# Patient Record
Sex: FEMALE | Race: WHITE | NOT HISPANIC OR LATINO | Employment: FULL TIME | URBAN - METROPOLITAN AREA
[De-identification: names, ages, dates, MRNs, and addresses within clinical notes are randomized per-mention and may not be internally consistent; named-entity substitution may affect disease eponyms.]

---

## 2023-08-21 ENCOUNTER — LAB (OUTPATIENT)
Dept: LAB | Facility: HOSPITAL | Age: 33
End: 2023-08-21
Payer: COMMERCIAL

## 2023-08-21 ENCOUNTER — OFFICE VISIT (OUTPATIENT)
Dept: INFECTIOUS DISEASES | Facility: CLINIC | Age: 33
End: 2023-08-21
Payer: COMMERCIAL

## 2023-08-21 VITALS
HEIGHT: 61 IN | HEART RATE: 73 BPM | TEMPERATURE: 98.1 F | BODY MASS INDEX: 39.08 KG/M2 | SYSTOLIC BLOOD PRESSURE: 137 MMHG | DIASTOLIC BLOOD PRESSURE: 83 MMHG | RESPIRATION RATE: 18 BRPM | WEIGHT: 207 LBS

## 2023-08-21 DIAGNOSIS — Z79.2 LONG TERM CURRENT USE OF ANTIBIOTICS: ICD-10-CM

## 2023-08-21 DIAGNOSIS — K04.7 DENTAL ABSCESS: ICD-10-CM

## 2023-08-21 DIAGNOSIS — K04.7 DENTAL ABSCESS: Primary | ICD-10-CM

## 2023-08-21 LAB
ALBUMIN SERPL-MCNC: 4.3 G/DL (ref 3.5–5.2)
ALBUMIN/GLOB SERPL: 1.7 G/DL
ALP SERPL-CCNC: 121 U/L (ref 39–117)
ALT SERPL W P-5'-P-CCNC: 21 U/L (ref 1–33)
ANION GAP SERPL CALCULATED.3IONS-SCNC: 9 MMOL/L (ref 5–15)
AST SERPL-CCNC: 22 U/L (ref 1–32)
BASOPHILS # BLD AUTO: 0.04 10*3/MM3 (ref 0–0.2)
BASOPHILS NFR BLD AUTO: 0.7 % (ref 0–1.5)
BILIRUB SERPL-MCNC: 0.3 MG/DL (ref 0–1.2)
BUN SERPL-MCNC: 13 MG/DL (ref 6–20)
BUN/CREAT SERPL: 14 (ref 7–25)
CALCIUM SPEC-SCNC: 9.3 MG/DL (ref 8.6–10.5)
CHLORIDE SERPL-SCNC: 108 MMOL/L (ref 98–107)
CO2 SERPL-SCNC: 24 MMOL/L (ref 22–29)
CREAT SERPL-MCNC: 0.93 MG/DL (ref 0.57–1)
DEPRECATED RDW RBC AUTO: 43.4 FL (ref 37–54)
EGFRCR SERPLBLD CKD-EPI 2021: 83.9 ML/MIN/1.73
EOSINOPHIL # BLD AUTO: 0.19 10*3/MM3 (ref 0–0.4)
EOSINOPHIL NFR BLD AUTO: 3.2 % (ref 0.3–6.2)
ERYTHROCYTE [DISTWIDTH] IN BLOOD BY AUTOMATED COUNT: 14.2 % (ref 12.3–15.4)
GLOBULIN UR ELPH-MCNC: 2.5 GM/DL
GLUCOSE SERPL-MCNC: 78 MG/DL (ref 65–99)
HCT VFR BLD AUTO: 37.6 % (ref 34–46.6)
HGB BLD-MCNC: 12.7 G/DL (ref 12–15.9)
IMM GRANULOCYTES # BLD AUTO: 0.03 10*3/MM3 (ref 0–0.05)
IMM GRANULOCYTES NFR BLD AUTO: 0.5 % (ref 0–0.5)
LYMPHOCYTES # BLD AUTO: 1.44 10*3/MM3 (ref 0.7–3.1)
LYMPHOCYTES NFR BLD AUTO: 24.3 % (ref 19.6–45.3)
MCH RBC QN AUTO: 28.6 PG (ref 26.6–33)
MCHC RBC AUTO-ENTMCNC: 33.8 G/DL (ref 31.5–35.7)
MCV RBC AUTO: 84.7 FL (ref 79–97)
MONOCYTES # BLD AUTO: 0.6 10*3/MM3 (ref 0.1–0.9)
MONOCYTES NFR BLD AUTO: 10.1 % (ref 5–12)
NEUTROPHILS NFR BLD AUTO: 3.63 10*3/MM3 (ref 1.7–7)
NEUTROPHILS NFR BLD AUTO: 61.2 % (ref 42.7–76)
NRBC BLD AUTO-RTO: 0 /100 WBC (ref 0–0.2)
PLATELET # BLD AUTO: 233 10*3/MM3 (ref 140–450)
PMV BLD AUTO: 10.8 FL (ref 6–12)
POTASSIUM SERPL-SCNC: 4 MMOL/L (ref 3.5–5.2)
PROT SERPL-MCNC: 6.8 G/DL (ref 6–8.5)
RBC # BLD AUTO: 4.44 10*6/MM3 (ref 3.77–5.28)
SODIUM SERPL-SCNC: 141 MMOL/L (ref 136–145)
WBC NRBC COR # BLD: 5.93 10*3/MM3 (ref 3.4–10.8)

## 2023-08-21 PROCEDURE — 36415 COLL VENOUS BLD VENIPUNCTURE: CPT

## 2023-08-21 PROCEDURE — 85025 COMPLETE CBC W/AUTO DIFF WBC: CPT

## 2023-08-21 PROCEDURE — 99205 OFFICE O/P NEW HI 60 MIN: CPT | Performed by: INTERNAL MEDICINE

## 2023-08-21 PROCEDURE — 80053 COMPREHEN METABOLIC PANEL: CPT

## 2023-08-21 RX ORDER — CLINDAMYCIN HYDROCHLORIDE 300 MG/1
1 CAPSULE ORAL 3 TIMES DAILY
COMMUNITY
Start: 2023-07-29 | End: 2023-08-21

## 2023-08-21 RX ORDER — METRONIDAZOLE 500 MG/1
500 TABLET ORAL 3 TIMES DAILY
COMMUNITY
End: 2023-08-21

## 2023-08-21 RX ORDER — ESCITALOPRAM OXALATE 20 MG/1
20 TABLET ORAL DAILY
COMMUNITY

## 2023-08-21 RX ORDER — AMOXICILLIN AND CLAVULANATE POTASSIUM 875; 125 MG/1; MG/1
1 TABLET, FILM COATED ORAL EVERY 12 HOURS
Qty: 28 TABLET | Refills: 0 | Status: SHIPPED | OUTPATIENT
Start: 2023-08-21 | End: 2023-09-04

## 2023-08-21 RX ORDER — TAMOXIFEN CITRATE 10 MG/1
10 TABLET ORAL DAILY
COMMUNITY

## 2023-08-21 RX ORDER — HYDROXYZINE HYDROCHLORIDE 10 MG/1
10 TABLET, FILM COATED ORAL DAILY
COMMUNITY

## 2023-08-21 NOTE — PROGRESS NOTES
Referring Provider: Maxx Hernandez, DMD  [address] [facility]  [address]  [place] [address]  Reason for clinic visits: Initial infectious disease clinic visit regarding mandibular abscess    HPI: Brenna SIDDIQI is a 33 y.o. female who presents to the infectious disease clinic with above complaint.    The patient underwent tooth extraction sometime in the beginning of July.  She was having pain in the area of her prior root canal with her fillings coming out multiple times.  She was scheduled to only have that tooth removed but had the second molar removed as well.  The patient is unclear why.  Unfortunately this was complicated by left-sided pain.  She went on vacation to [place] when her pain acutely worsened and she was ultimately admitted to [facility] and imaging revealed a left-sided abscess.  She was treated with IV antibiotics and steroids and underwent surgical incision and drainage. He was initially treated with IV vancomycin and meropenem.  The plan was to discharge her on a 3-week course of ertapenem but unfortunately this was not able to be arranged.  She was discharged with a PICC line in place on clindamycin 300 mg p.o. 3 times daily and Flagyl 500 mg p.o. 3 times daily.  She has remained on that antibiotics since that time.  She has been seen by her oral surgeon on August 1 and had a repeat CAT scan done on August 3 which showed a small rim-enhancing fluid collection near the left mandible concerning for an abscess.  She states her pain has resolved but she is still unable to open her mouth without feeling pain in the left jaw.  Her swelling has significantly improved although some dull remains present on the inside of her mouth.  She denies any fevers chills or night sweats.  She is tolerating antibiotics without abdominal pain nausea vomiting or diarrhea.  No rashes.  Left upper extremity PICC line remains in place without erythema or  induration    PMH  Nephrolithiasis  Anxiety  History of breast cancer  Status post hysterectomy    Social History   has no history on file for tobacco use, alcohol use, and drug use.    Family History  family history is not on file.    Not on File    The medication list has been reviewed and updated.     Review of Systems  Pertinent items are noted in HPI, all other systems reviewed and negative    Vital Signs   Vitals:    08/21/23 1016   BP: 137/83   Pulse: 73   Resp: 18   Temp: 98.1 øF (36.7 øC)        Physical Exam:   General: In no acute distress  HEENT: Patient is only able to open her mouth 1 to 2 cm, some swelling on the left persist no erythema or drainage   Respiratory: Breathing comfortably on room air  Neurological: Alert and oriented, moving all 4 extremities  Psychiatric: Normal mood and affect     7/25 white blood cell count 14.8 hematocrit 28 platelets 288    7/24 MRSA surveillance nasal screen negative    8/3 CT of the neck shows a bone defect in the left body of the mandible at the site of the apparent extraction of the left first second mandibular molar teeth.  There is a rim-enhancing fluid collection lateral to the body of the mandible concerning for small abscess.  There is also some tissue abnormality to the left of the mandible at the floor of the mouth.  Possibly phlegmonous changes but there is streak artifact    7/24 CT of the neck with marked interval improvement in the left tonsillar region with near complete resolution of the large tonsillar abscess seen on prior examination.  Extensive reactive changes in the left neck.  Reactive adenopathy on the left.  Postsurgical changes.    Assessment:  This is a 33 y.o. female who presents to clinic today for evaluation of a submandibular abscess.  No culture data is available for my review.  It certainly sounds that the patient has improved as compared to her hospital discharge on July 30.  I am concerned that the repeat scan on August 3 shows  what appears to be an abscess.  Additionally the patient is unable to open her mouth due to pain.  At this time I recommend obtaining a stat CT of the neck to assess the area.  If an abscess is still present this will need to be drained.  The patient and her  are not willing to return back to the original oral surgeon and are requesting a second opinion.  In the meantime we will discontinue empiric Flagyl and clindamycin and start the patient on Augmentin 875 mg p.o. twice daily.  She has a penicillin allergy but this occurred as a child and it was a rash.  She is willing to try amoxicillin at this time.  I asked her to call the infectious disease clinic with any worsening symptoms including but not limited to pain increased swelling shortness of breath or rash.  The patient voiced understanding and is willing to proceed    Plan:   Obtain a stat CT of the neck/soft tissues  Check a CBC with differential and CMP  Discontinue empiric clindamycin and Flagyl and start Augmentin 875 mg p.o. twice daily x14 days  Maintain PICC line for now, will provide patients with supplies until CT of the neck is available    Return to Infectious Disease clinic TBD    Time: More than 50% of time spent in counseling and coordination of care:  Total face-to-face/floor time 60 min.  Time spent in counseling 60 min. Counseling included the following topics: See assessment above

## 2023-08-23 DIAGNOSIS — K04.7 DENTAL ABSCESS: Primary | ICD-10-CM

## 2023-08-25 ENCOUNTER — PATIENT ROUNDING (BHMG ONLY) (OUTPATIENT)
Dept: INFECTIOUS DISEASES | Facility: CLINIC | Age: 33
End: 2023-08-25
Payer: COMMERCIAL

## 2023-08-25 NOTE — PROGRESS NOTES
August 25, 2023    DONE IN OFFICE                We're always looking for ways to make our patients' experiences even better. Do you have recommendations on ways we may improve?  no    Overall were you satisfied with your first visit to our practice? yes

## 2023-08-30 ENCOUNTER — TELEPHONE (OUTPATIENT)
Dept: INFECTIOUS DISEASES | Facility: CLINIC | Age: 33
End: 2023-08-30
Payer: COMMERCIAL

## 2023-08-30 NOTE — TELEPHONE ENCOUNTER
9/1/2023 Received message back from Dr. Oakes that she would have Karen BURT Work on this.    Dr. Oakes-  Patient called stating Dr. Maddox/Oral surgeon's office called stating they are not able to assist since they are not in network w/ patient's insurance. Patient is asking if you can send out another referral.   Please advise.